# Patient Record
Sex: FEMALE | Race: WHITE | URBAN - METROPOLITAN AREA
[De-identification: names, ages, dates, MRNs, and addresses within clinical notes are randomized per-mention and may not be internally consistent; named-entity substitution may affect disease eponyms.]

---

## 2024-07-09 ENCOUNTER — OFFICE VISIT (OUTPATIENT)
Dept: URBAN - METROPOLITAN AREA CLINIC 128 | Facility: CLINIC | Age: 44
End: 2024-07-09
Payer: COMMERCIAL

## 2024-07-09 ENCOUNTER — DASHBOARD ENCOUNTERS (OUTPATIENT)
Age: 44
End: 2024-07-09

## 2024-07-09 VITALS
TEMPERATURE: 97.9 F | HEIGHT: 63 IN | SYSTOLIC BLOOD PRESSURE: 116 MMHG | HEART RATE: 75 BPM | BODY MASS INDEX: 29.77 KG/M2 | WEIGHT: 168 LBS | DIASTOLIC BLOOD PRESSURE: 80 MMHG

## 2024-07-09 DIAGNOSIS — K80.20 GALLSTONES: ICD-10-CM

## 2024-07-09 DIAGNOSIS — F10.10 ALCOHOL ABUSE: ICD-10-CM

## 2024-07-09 DIAGNOSIS — R63.4 WEIGHT LOSS: ICD-10-CM

## 2024-07-09 DIAGNOSIS — R11.10 RECURRENT VOMITING: ICD-10-CM

## 2024-07-09 PROBLEM — 15167005: Status: ACTIVE | Noted: 2024-07-09

## 2024-07-09 PROBLEM — 235919008: Status: ACTIVE | Noted: 2024-07-09

## 2024-07-09 PROCEDURE — 99204 OFFICE O/P NEW MOD 45 MIN: CPT | Performed by: PHYSICIAN ASSISTANT

## 2024-07-09 RX ORDER — TRAZODONE HYDROCHLORIDE 100 MG/1
1 TABLET AT BEDTIME TABLET ORAL TWICE A DAY
Status: ACTIVE | COMMUNITY
Start: 2024-07-08

## 2024-07-09 RX ORDER — ALPRAZOLAM 0.5 MG/1
1 TABLET TABLET ORAL TWICE A DAY
Status: ON HOLD | COMMUNITY
Start: 2024-07-08

## 2024-07-09 NOTE — HPI-TODAY'S VISIT:
Patient is 44 yr old referred by Dr Santos Royal due to liver disease, vomiting. She saw him for possible cholecystectomy due to cholelithiasis. He noticed a weight lost of 85 lbs in a year. She has increased her drinking since her parents  form COVID 2020.  She is drinking 6 glasses of vodka a day , not eating much and cant stop drinking due to tremors. vomiting 2-3x wk She denies any black emesis or red blood in emetic material. She denies any stomach pain, no eating much, She takes miralax for constipation and having regular BMs daily. She wants to stop drinking but afraid to due to so due to tremor. She doesnot want her family to know either. She has insomnia and is on trazodone but is not helping much. She feels her weight loss in the last couple of year is because of not eating. RUQ  2024: cholelithiasis and hepatic steatosis.

## 2024-08-28 ENCOUNTER — TELEPHONE ENCOUNTER (OUTPATIENT)
Dept: URBAN - METROPOLITAN AREA CLINIC 128 | Facility: CLINIC | Age: 44
End: 2024-08-28

## 2025-08-22 ENCOUNTER — LAB OUTSIDE AN ENCOUNTER (OUTPATIENT)
Dept: URBAN - METROPOLITAN AREA CLINIC 128 | Facility: CLINIC | Age: 45
End: 2025-08-22

## 2025-08-22 ENCOUNTER — OFFICE VISIT (OUTPATIENT)
Dept: URBAN - METROPOLITAN AREA CLINIC 128 | Facility: CLINIC | Age: 45
End: 2025-08-22

## 2025-08-22 DIAGNOSIS — R74.8 ELEVATED LIVER ENZYMES: ICD-10-CM

## 2025-08-22 DIAGNOSIS — R10.13 DYSPEPSIA: ICD-10-CM

## 2025-08-22 DIAGNOSIS — F10.10 ALCOHOL ABUSE: ICD-10-CM

## 2025-08-22 DIAGNOSIS — K80.20 GALLSTONES: ICD-10-CM

## 2025-08-22 DIAGNOSIS — R63.4 WEIGHT LOSS: ICD-10-CM

## 2025-08-22 PROCEDURE — 99214 OFFICE O/P EST MOD 30 MIN: CPT | Performed by: PHYSICIAN ASSISTANT

## 2025-08-22 RX ORDER — PRAZOSIN HYDROCHLORIDE 1 MG/1
1 CAPSULE AT BEDTIME CAPSULE ORAL ONCE A DAY
Status: ACTIVE | COMMUNITY

## 2025-08-22 RX ORDER — HYDROXYZINE HYDROCHLORIDE 50 MG/1
1 TABLET AS NEEDED TABLET, FILM COATED ORAL ONCE A DAY
Status: ACTIVE | COMMUNITY

## 2025-08-22 RX ORDER — GABAPENTIN 300 MG/1
1 CAPSULE CAPSULE ORAL ONCE A DAY
Status: ACTIVE | COMMUNITY

## 2025-08-22 RX ORDER — IBUPROFEN 800 MG/1
1 TABLET WITH FOOD OR MILK AS NEEDED TABLET, FILM COATED ORAL
Status: ACTIVE | COMMUNITY

## 2025-08-22 RX ORDER — ALPRAZOLAM 0.5 MG/1
1 TABLET TABLET ORAL TWICE A DAY
Status: DISCONTINUED | COMMUNITY
Start: 2024-07-08

## 2025-08-22 RX ORDER — PANTOPRAZOLE SODIUM 40 MG/1
1 TABLET 1/2 TO 1 HOUR BEFORE MORNING MEAL TABLET, DELAYED RELEASE ORAL TWICE A DAY
Qty: 60 | Refills: 1 | OUTPATIENT
Start: 2025-08-22

## 2025-08-22 RX ORDER — BUSPIRONE HYDROCHLORIDE 15 MG/1
1 TABLET TABLET ORAL TWICE A DAY
Status: ACTIVE | COMMUNITY

## 2025-08-22 RX ORDER — CLONIDINE 0.1 MG/D
1 PATCH TO SKIN PATCH TRANSDERMAL
Status: ACTIVE | COMMUNITY

## 2025-08-22 RX ORDER — LORAZEPAM 1 MG/1
1 TABLET AT BEDTIME AS NEEDED TABLET ORAL ONCE A DAY
Status: ACTIVE | COMMUNITY

## 2025-08-22 RX ORDER — AZELASTINE HYDROCHLORIDE 137 UG/1
2 PUFFS (1 SPRAY IN EACH NOSTRIL) SPRAY, METERED NASAL TWICE A DAY
Status: ACTIVE | COMMUNITY

## 2025-08-22 RX ORDER — TRAZODONE HYDROCHLORIDE 100 MG/1
1 TABLET AT BEDTIME TABLET ORAL TWICE A DAY
Status: DISCONTINUED | COMMUNITY
Start: 2024-07-08

## 2025-08-22 RX ORDER — FLUOXETINE HYDROCHLORIDE 20 MG/1
1 CAPSULE CAPSULE ORAL ONCE A DAY
Status: ACTIVE | COMMUNITY

## 2025-08-22 RX ORDER — TRAZODONE HYDROCHLORIDE 50 MG/1
1 TABLET AT BEDTIME AS NEEDED TABLET ORAL ONCE A DAY
Status: ACTIVE | COMMUNITY